# Patient Record
Sex: MALE | Race: WHITE | NOT HISPANIC OR LATINO | URBAN - NONMETROPOLITAN AREA
[De-identification: names, ages, dates, MRNs, and addresses within clinical notes are randomized per-mention and may not be internally consistent; named-entity substitution may affect disease eponyms.]

---

## 2017-12-05 ENCOUNTER — OFFICE VISIT (OUTPATIENT)
Dept: UROLOGY | Facility: CLINIC | Age: 22
End: 2017-12-05

## 2017-12-05 DIAGNOSIS — L91.8 ACROCHORDON: Primary | ICD-10-CM

## 2017-12-05 PROCEDURE — 11421 EXC H-F-NK-SP B9+MARG 0.6-1: CPT | Performed by: UROLOGY

## 2017-12-05 NOTE — PROGRESS NOTES
Chief Complaint:          Chief Complaint   Patient presents with   • Skin Lesion       HPI:   22 y.o. male.    22-year-old white male with an inner aspect of his thigh acrochordon.  He like it excised.   There is significant pain and irritation      Past Medical History:      History reviewed. No pertinent past medical history.      Current Meds:     Current Outpatient Prescriptions   Medication Sig Dispense Refill   • acyclovir (ZOVIRAX) 400 MG tablet Take 2 tablets by mouth 4 (Four) Times a Day. Take no more than 5 doses a day. 40 tablet 0     No current facility-administered medications for this visit.         Allergies:      No Known Allergies     Past Surgical History:     No past surgical history on file.      Social History:     Social History     Social History   • Marital status:      Spouse name: N/A   • Number of children: N/A   • Years of education: N/A     Occupational History   • Not on file.     Social History Main Topics   • Smoking status: Light Tobacco Smoker   • Smokeless tobacco: Not on file   • Alcohol use No   • Drug use: No   • Sexual activity: Defer     Other Topics Concern   • Not on file     Social History Narrative       Family History:     History reviewed. No pertinent family history.    Review of Systems:     Review of Systems    Physical Exam:     Physical Exam    Procedure:   After appropriate informed consent the right thigh was  prepped and draped with Betadine the base was infiltrated with 1% Xylocaine I made an elliptical incision and excised off the subcutaneous fat with full thickness of dermis was reapproximated with 3-0 chromic sutures without complication he tolerated it well and bacitracin was applied rate was clearly an acrochordon    Assessment:   No diagnosis found.  No orders of the defined types were placed in this encounter.      Plan:   Right Thigh acrochordon -Causing significant discomfort pain therefore excision is indicated          This document has been  electronically signed by CLAYTON ROACH MD December 5, 2017 10:57 AM

## 2021-05-09 ENCOUNTER — APPOINTMENT (OUTPATIENT)
Dept: GENERAL RADIOLOGY | Facility: HOSPITAL | Age: 26
End: 2021-05-09

## 2021-05-09 ENCOUNTER — HOSPITAL ENCOUNTER (EMERGENCY)
Facility: HOSPITAL | Age: 26
Discharge: HOME | End: 2021-05-09
Attending: EMERGENCY MEDICINE | Admitting: EMERGENCY MEDICINE

## 2021-05-09 VITALS
WEIGHT: 180 LBS | SYSTOLIC BLOOD PRESSURE: 130 MMHG | TEMPERATURE: 98 F | HEIGHT: 70 IN | OXYGEN SATURATION: 99 % | BODY MASS INDEX: 25.77 KG/M2 | DIASTOLIC BLOOD PRESSURE: 74 MMHG | RESPIRATION RATE: 18 BRPM | HEART RATE: 90 BPM

## 2021-05-09 DIAGNOSIS — S92.002A CLOSED NONDISPLACED FRACTURE OF LEFT CALCANEUS, UNSPECIFIED PORTION OF CALCANEUS, INITIAL ENCOUNTER: Primary | ICD-10-CM

## 2021-05-09 PROCEDURE — 73610 X-RAY EXAM OF ANKLE: CPT

## 2021-05-09 PROCEDURE — 73590 X-RAY EXAM OF LOWER LEG: CPT

## 2021-05-09 PROCEDURE — 99283 EMERGENCY DEPT VISIT LOW MDM: CPT

## 2021-05-09 PROCEDURE — 73630 X-RAY EXAM OF FOOT: CPT

## 2021-05-09 RX ORDER — HYDROCODONE BITARTRATE AND ACETAMINOPHEN 7.5; 325 MG/1; MG/1
1 TABLET ORAL EVERY 6 HOURS PRN
Qty: 12 TABLET | Refills: 0 | Status: SHIPPED | OUTPATIENT
Start: 2021-05-09